# Patient Record
Sex: MALE | Race: WHITE | NOT HISPANIC OR LATINO | Employment: OTHER | URBAN - NONMETROPOLITAN AREA
[De-identification: names, ages, dates, MRNs, and addresses within clinical notes are randomized per-mention and may not be internally consistent; named-entity substitution may affect disease eponyms.]

---

## 2022-06-15 ENCOUNTER — OFFICE VISIT (OUTPATIENT)
Dept: FAMILY MEDICINE CLINIC | Facility: CLINIC | Age: 77
End: 2022-06-15

## 2022-06-15 VITALS
OXYGEN SATURATION: 99 % | HEART RATE: 69 BPM | SYSTOLIC BLOOD PRESSURE: 126 MMHG | WEIGHT: 200 LBS | HEIGHT: 76 IN | BODY MASS INDEX: 24.36 KG/M2 | TEMPERATURE: 96 F | DIASTOLIC BLOOD PRESSURE: 78 MMHG

## 2022-06-15 DIAGNOSIS — H93.8X3 EAR CONGESTION, BILATERAL: Primary | ICD-10-CM

## 2022-06-15 DIAGNOSIS — H61.23 BILATERAL IMPACTED CERUMEN: ICD-10-CM

## 2022-06-15 PROCEDURE — 99202 OFFICE O/P NEW SF 15 MIN: CPT | Performed by: NURSE PRACTITIONER

## 2022-06-15 PROCEDURE — 69209 REMOVE IMPACTED EAR WAX UNI: CPT | Performed by: NURSE PRACTITIONER

## 2022-06-15 RX ORDER — METHOCARBAMOL 500 MG/1
TABLET, FILM COATED ORAL AS NEEDED
COMMUNITY

## 2022-06-15 RX ORDER — DOXAZOSIN 8 MG/1
TABLET ORAL
COMMUNITY

## 2022-06-15 RX ORDER — OMEPRAZOLE 40 MG/1
CAPSULE, DELAYED RELEASE ORAL
COMMUNITY
End: 2022-09-29 | Stop reason: ALTCHOICE

## 2022-06-15 RX ORDER — MECLIZINE HYDROCHLORIDE 25 MG/1
TABLET ORAL
COMMUNITY

## 2022-06-15 RX ORDER — MONTELUKAST SODIUM 10 MG/1
TABLET ORAL
COMMUNITY

## 2022-06-15 RX ORDER — METRONIDAZOLE 7.5 MG/G
GEL TOPICAL
COMMUNITY

## 2022-06-15 RX ORDER — SILDENAFIL 100 MG/1
TABLET, FILM COATED ORAL
COMMUNITY

## 2022-06-15 RX ORDER — TRIAMCINOLONE ACETONIDE 1 MG/G
CREAM TOPICAL
COMMUNITY

## 2022-06-15 RX ORDER — POLYETHYLENE GLYCOL 3350 17 G/17G
POWDER, FOR SOLUTION ORAL
COMMUNITY

## 2022-06-15 RX ORDER — POTASSIUM CHLORIDE 750 MG/1
TABLET, FILM COATED, EXTENDED RELEASE ORAL
COMMUNITY

## 2022-06-15 RX ORDER — PROMETHAZINE HYDROCHLORIDE 25 MG/1
TABLET ORAL
COMMUNITY

## 2022-06-15 RX ORDER — AZELASTINE 1 MG/ML
SPRAY, METERED NASAL
COMMUNITY

## 2022-06-15 RX ORDER — BISOPROLOL FUMARATE 5 MG/1
TABLET, FILM COATED ORAL
COMMUNITY

## 2022-06-15 RX ORDER — BACLOFEN 10 MG/1
TABLET ORAL EVERY 6 HOURS SCHEDULED
COMMUNITY
End: 2022-09-29

## 2022-06-15 NOTE — PROGRESS NOTES
"Chief Complaint  Ear Fullness (Check for ear wax. )    Patient is here for an urgent care/acute visit.  Patient has an established, non-[facility] Primary Care Provider.       Subjective          Bobby Mariano presents to [facility] for acute care (ear congestion).    Ear Fullness   There is pain in both ears. This is a recurrent problem. Episode onset: this episode approximately 9 months ago. The problem occurs constantly. The problem has been gradually worsening. There has been no fever. The patient is experiencing no pain. Associated symptoms include hearing loss and rhinorrhea. Pertinent negatives include no abdominal pain, coughing, diarrhea, ear discharge, headaches, neck pain, rash, sore throat or vomiting. Associated symptoms comments: Seasonal allergies. He has tried nothing for the symptoms. His past medical history is significant for hearing loss.     Objective   Vital Signs:  /78 (BP Location: Left arm, Patient Position: Sitting, Cuff Size: Adult)   Pulse 69   Temp 96 °F (35.6 °C) (Temporal)   Ht 193 cm (76\")   Wt 90.7 kg (200 lb)   SpO2 99%   BMI 24.34 kg/m²     BMI:   BMI is within normal parameters. No other follow-up for BMI required.      Physical Exam  Vitals and nursing note reviewed.   Constitutional:       General: He is awake.      Appearance: Normal appearance.   HENT:      Head: Normocephalic.      Right Ear: Hearing, tympanic membrane and external ear normal. There is impacted cerumen.      Left Ear: Hearing, tympanic membrane and external ear normal. There is impacted cerumen.      Ears:      Comments: Slight erythema noted to bilateral external auditory canals     Nose: Nose normal.      Mouth/Throat:      Lips: Pink.      Mouth: Mucous membranes are moist.   Eyes:      General: Lids are normal.      Conjunctiva/sclera: Conjunctivae normal.      Pupils: Pupils are equal, round, and reactive to light.   Cardiovascular:      Rate and Rhythm: Normal " rate and regular rhythm.      Pulses: Normal pulses.      Heart sounds: Normal heart sounds.   Pulmonary:      Effort: Pulmonary effort is normal.      Breath sounds: Normal breath sounds.   Abdominal:      General: Abdomen is protuberant. Bowel sounds are normal.      Palpations: Abdomen is soft.      Tenderness: There is no abdominal tenderness.   Musculoskeletal:         General: Normal range of motion.      Cervical back: Normal range of motion.      Right lower leg: No edema.      Left lower leg: No edema.   Lymphadenopathy:      Cervical: No cervical adenopathy.   Skin:     General: Skin is warm and dry.      Capillary Refill: Capillary refill takes less than 2 seconds.   Neurological:      Mental Status: He is alert and oriented to person, place, and time.      Sensory: Sensation is intact.      Motor: Motor function is intact.      Coordination: Coordination is intact.      Gait: Gait is intact.   Psychiatric:         Attention and Perception: Attention and perception normal.         Mood and Affect: Mood and affect normal.         Speech: Speech normal.         Behavior: Behavior normal. Behavior is cooperative.         Thought Content: Thought content normal.         Cognition and Memory: Cognition normal.         Judgment: Judgment normal.        Result Review :   The following data was reviewed by: HILLARY Allen on 06/15/2022:     Ear Cerumen Removal    Date/Time: 6/15/2022 10:24 AM  Performed by: Elizabeth Miller APRN  Authorized by: Elizabeth Miller APRN     Anesthesia:  Local Anesthetic: none  Location details: right ear and left ear  Patient tolerance: patient tolerated the procedure well with no immediate complications  Comments: Performed by Renny Wong. Slight erythema noted to bilateral ear canals post procedure.  TM WNL.   Procedure type: irrigation   Sedation:  Patient sedated: no        Assessment and Plan    Diagnoses and all orders for this visit:    1. Ear congestion, bilateral  (Primary)  -     Cerumen Removal    2. Bilateral impacted cerumen  -     Cerumen Removal         I spent 20 minutes caring for Bobby on this date of service. This time includes time spent by me in the following activities:preparing for the visit, reviewing tests, obtaining and/or reviewing a separately obtained history, performing a medically appropriate examination and/or evaluation , counseling and educating the patient/family/caregiver, ordering medications, tests, or procedures, documenting information in the medical record and independently interpreting results and communicating that information with the patient/family/caregiver     Follow Up   Return if symptoms worsen or fail to improve / PCP.    Patient was given instructions and counseling regarding his condition or for health maintenance advice. Please see specific information pulled into the AVS if appropriate.       This document has been electronically signed by HILLARY Allen  Neida 15, 2022 11:32 EDT

## 2022-09-29 ENCOUNTER — OFFICE VISIT (OUTPATIENT)
Dept: FAMILY MEDICINE CLINIC | Facility: CLINIC | Age: 77
End: 2022-09-29

## 2022-09-29 VITALS
WEIGHT: 203 LBS | OXYGEN SATURATION: 97 % | HEIGHT: 76 IN | DIASTOLIC BLOOD PRESSURE: 78 MMHG | TEMPERATURE: 97.7 F | BODY MASS INDEX: 24.72 KG/M2 | SYSTOLIC BLOOD PRESSURE: 118 MMHG | RESPIRATION RATE: 20 BRPM | HEART RATE: 70 BPM

## 2022-09-29 DIAGNOSIS — H61.23 BILATERAL IMPACTED CERUMEN: ICD-10-CM

## 2022-09-29 DIAGNOSIS — S90.812A ABRASION, LEFT FOOT, INITIAL ENCOUNTER: Primary | ICD-10-CM
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
## 2022-09-29 PROCEDURE — 99213 OFFICE O/P EST LOW 20 MIN: CPT | Performed by: NURSE PRACTITIONER

## 2022-09-29 PROCEDURE — 69209 REMOVE IMPACTED EAR WAX UNI: CPT | Performed by: NURSE PRACTITIONER

## 2022-09-29 RX ORDER — PANTOPRAZOLE SODIUM 20 MG/1
TABLET, DELAYED RELEASE ORAL
COMMUNITY

## 2022-09-29 RX ORDER — AZITHROMYCIN 250 MG/1
TABLET, FILM COATED ORAL
Qty: 6 TABLET | Refills: 0 | Status: SHIPPED | OUTPATIENT
Start: 2022-09-29 | End: 2022-10-25

## 2022-09-29 NOTE — PROGRESS NOTES
"Chief Complaint  Cat scratch (Top of left foot this past week-end) and ears feel full    Patient is here for an urgent care/acute visit.  Patient has an established, non-[facility] Primary Care Provider.     Subjective          Bobby Mariano presents to [facility] for acute care (cat scratch).    Abrasion  This is a new problem. The current episode started in the past 7 days (Fri or Sat last week). The problem occurs constantly. The problem has been gradually improving. Pertinent negatives include no abdominal pain, anorexia, arthralgias, change in bowel habit, chest pain, chills, congestion, coughing, diaphoresis, fatigue, fever, headaches, joint swelling, myalgias, nausea, neck pain, numbness, rash, sore throat, swollen glands, urinary symptoms, vertigo, visual change, vomiting or weakness. Associated symptoms comments: Soreness; two abrasions to left dorsal foot. Nothing aggravates the symptoms. Treatments tried: bacitracin ointment. The treatment provided moderate relief.     Objective   Vital Signs:  /78 (BP Location: Right arm, Patient Position: Sitting, Cuff Size: Adult)   Pulse 70   Temp 97.7 °F (36.5 °C) (Temporal)   Resp 20   Ht 193 cm (76\")   Wt 92.1 kg (203 lb)   SpO2 97%   BMI 24.71 kg/m²     BMI:   BMI is within normal parameters. No other follow-up for BMI required.      Physical Exam  Vitals and nursing note reviewed.   Constitutional:       General: He is awake.      Appearance: Normal appearance.   HENT:      Head: Normocephalic.      Right Ear: Hearing and external ear normal. There is impacted cerumen.      Left Ear: Hearing and external ear normal. There is impacted cerumen.      Nose: Nose normal.      Mouth/Throat:      Lips: Pink.      Mouth: Mucous membranes are moist.   Eyes:      General: Lids are normal.      Conjunctiva/sclera: Conjunctivae normal.      Pupils: Pupils are equal, round, and reactive to light.   Cardiovascular:      Rate and " Rhythm: Normal rate and regular rhythm.      Heart sounds: Normal heart sounds.   Pulmonary:      Effort: Pulmonary effort is normal.      Breath sounds: Normal breath sounds.   Abdominal:      General: Abdomen is protuberant. Bowel sounds are normal.      Palpations: Abdomen is soft.      Tenderness: There is no abdominal tenderness.   Musculoskeletal:         General: Normal range of motion.      Cervical back: Normal range of motion.        Feet:    Skin:     General: Skin is warm and dry.      Capillary Refill: Capillary refill takes less than 2 seconds.   Neurological:      Mental Status: He is alert and oriented to person, place, and time.      Sensory: Sensation is intact.      Motor: Motor function is intact.      Coordination: Coordination is intact.      Gait: Gait is intact.   Psychiatric:         Attention and Perception: Attention and perception normal.         Mood and Affect: Mood and affect normal.         Speech: Speech normal.         Behavior: Behavior normal. Behavior is cooperative.         Thought Content: Thought content normal.         Cognition and Memory: Cognition normal.         Judgment: Judgment normal.        Result Review :   The following data was reviewed by: HILLARY Allen on 09/29/2022:    Ear Cerumen Removal    Date/Time: 9/29/2022 12:36 PM  Performed by: Elizabeth Miller APRN  Authorized by: Elizabeth Miller APRN     Anesthesia:  Local Anesthetic: none  Location details: left ear and right ear  Patient tolerance: patient tolerated the procedure well with no immediate complications  Comments: Performed by Renny Wong. Bilateral external canals clear with TM WNL.   Procedure type: irrigation   Sedation:  Patient sedated: no          Assessment and Plan    Diagnoses and all orders for this visit:    1. Abrasion, left foot, initial encounter (Primary)  -     mupirocin (BACTROBAN) 2 % ointment; Apply 1 application topically to the appropriate area as directed 3 (Three) Times a  Day.  Dispense: 1 each; Refill: 0  -     azithromycin (Zithromax) 250 MG tablet; Take 2 tablets the first day, then 1 tablet daily for 4 days.  Dispense: 6 tablet; Refill: 0    2. Cat scratch  -     mupirocin (BACTROBAN) 2 % ointment; Apply 1 application topically to the appropriate area as directed 3 (Three) Times a Day.  Dispense: 1 each; Refill: 0  -     azithromycin (Zithromax) 250 MG tablet; Take 2 tablets the first day, then 1 tablet daily for 4 days.  Dispense: 6 tablet; Refill: 0    Other orders  -     Ear Cerumen Removal         I spent 25 minutes caring for Bobby on this date of service. This time includes time spent by me in the following activities:preparing for the visit, reviewing tests, obtaining and/or reviewing a separately obtained history, performing a medically appropriate examination and/or evaluation , counseling and educating the patient/family/caregiver, ordering medications, tests, or procedures, documenting information in the medical record and independently interpreting results and communicating that information with the patient/family/caregiver     Follow Up   Return if symptoms worsen or fail to improve.    Patient was given instructions and counseling regarding his condition or for health maintenance advice. Please see specific information pulled into the AVS if appropriate.       This document has been electronically signed by HILLARY Allen  September 29, 2022 12:43 EDT

## 2022-10-25 ENCOUNTER — OFFICE VISIT (OUTPATIENT)
Dept: FAMILY MEDICINE CLINIC | Facility: CLINIC | Age: 77
End: 2022-10-25

## 2022-10-25 VITALS
TEMPERATURE: 98.2 F | RESPIRATION RATE: 18 BRPM | BODY MASS INDEX: 24.72 KG/M2 | HEIGHT: 76 IN | OXYGEN SATURATION: 98 % | HEART RATE: 72 BPM | WEIGHT: 203 LBS | SYSTOLIC BLOOD PRESSURE: 139 MMHG | DIASTOLIC BLOOD PRESSURE: 84 MMHG

## 2022-10-25 DIAGNOSIS — H61.21 IMPACTED CERUMEN OF RIGHT EAR: Primary | ICD-10-CM

## 2022-10-25 PROCEDURE — 69209 REMOVE IMPACTED EAR WAX UNI: CPT | Performed by: NURSE PRACTITIONER

## 2022-10-25 PROCEDURE — 99213 OFFICE O/P EST LOW 20 MIN: CPT | Performed by: NURSE PRACTITIONER

## 2022-10-25 NOTE — PROGRESS NOTES
"Chief Complaint  Ear Fullness (Requesting Ear irrigation:)    Patient is here for an urgent care/acute visit.  Patient has an established, non-[facility] Primary Care Provider.     Subjective          Bobby Mariano presents to [facility] for acute care (ear congestion).    Ear Fullness   There is pain in the right ear. This is a recurrent problem. The current episode started in the past 7 days. The problem has been gradually worsening. There has been no fever. The patient is experiencing no pain. Associated symptoms include hearing loss. Pertinent negatives include no abdominal pain, coughing, diarrhea, ear discharge, headaches, neck pain, rash, rhinorrhea, sore throat or vomiting. He has tried nothing for the symptoms. His past medical history is significant for hearing loss.     Objective   Vital Signs:  /84   Pulse 72   Temp 98.2 °F (36.8 °C) (Temporal)   Resp 18   Ht 193 cm (76\")   Wt 92.1 kg (203 lb)   SpO2 98%   BMI 24.71 kg/m²     BMI:   BMI is within normal parameters. No other follow-up for BMI required.      Physical Exam  Vitals and nursing note reviewed.   Constitutional:       General: He is awake.      Appearance: Normal appearance.   HENT:      Head: Normocephalic.      Right Ear: Tympanic membrane and external ear normal. Decreased hearing noted. There is impacted cerumen.      Left Ear: Tympanic membrane and external ear normal. Decreased hearing noted.      Ears:      Comments: Pt wears bilateral hearing aids       Nose: Nose normal.      Mouth/Throat:      Lips: Pink.      Mouth: Mucous membranes are moist.   Eyes:      General: Lids are normal.      Conjunctiva/sclera: Conjunctivae normal.      Pupils: Pupils are equal, round, and reactive to light.   Cardiovascular:      Rate and Rhythm: Normal rate.   Pulmonary:      Effort: Pulmonary effort is normal.   Musculoskeletal:         General: Normal range of motion.      Cervical back: Normal range of " motion.   Skin:     General: Skin is warm and dry.      Capillary Refill: Capillary refill takes less than 2 seconds.   Neurological:      Mental Status: He is alert and oriented to person, place, and time.      Sensory: Sensation is intact.      Motor: Motor function is intact.      Coordination: Coordination is intact.      Gait: Gait is intact.   Psychiatric:         Attention and Perception: Attention and perception normal.         Mood and Affect: Mood and affect normal.         Speech: Speech normal.         Behavior: Behavior normal. Behavior is cooperative.         Thought Content: Thought content normal.         Cognition and Memory: Cognition normal.         Judgment: Judgment normal.        Result Review :   The following data was reviewed by: IHLLARY Allen on 10/25/2022:       Ear Cerumen Removal    Date/Time: 10/25/2022 10:05 AM  Performed by: Elizabeth Miller APRN  Authorized by: Elizabeth Miller APRN   Location details: right ear  Patient tolerance: patient tolerated the procedure well with no immediate complications  Comments: Performed by Mariah Dawn MA. Right external auditory canal clear of cerumen; no erythema/edema noted. TM WNL.  Procedure type: irrigation   Sedation:  Patient sedated: no              Assessment and Plan    Diagnoses and all orders for this visit:    1. Impacted cerumen of right ear (Primary)  -     Cerumen Removal           I spent 15 minutes caring for Bobby on this date of service. This time includes time spent by me in the following activities:preparing for the visit, reviewing tests, obtaining and/or reviewing a separately obtained history, performing a medically appropriate examination and/or evaluation , counseling and educating the patient/family/caregiver, ordering medications, tests, or procedures, documenting information in the medical record and independently interpreting results and communicating that information with the patient/family/caregiver      Follow Up   Return if symptoms worsen or fail to improve.    Patient was given instructions and counseling regarding his condition or for health maintenance advice. Please see specific information pulled into the AVS if appropriate.       This document has been electronically signed by HILLARY Allen  November 9, 2022 09:08 EST

## 2023-02-20 ENCOUNTER — TELEPHONE (OUTPATIENT)
Dept: FAMILY MEDICINE CLINIC | Facility: CLINIC | Age: 78
End: 2023-02-20

## 2023-02-20 NOTE — TELEPHONE ENCOUNTER
Caller: Bobby Mariano    Relationship to patient: Self    Best call back number: [phone]    Chief complaint: REQUESTING EARWAX REMOVAL FROM BOTH EARS     Type of visit: IN-OFFICE PROCEDURE    Requested date: ASAP    If rescheduling, when is the original appointment: N/A    Additional notes:PLEASE CALL AND ADVISE     (PLEASE ADD LILIANE BUNDY AS THE PATIENT'S PCP)

## 2023-02-22 ENCOUNTER — OFFICE VISIT (OUTPATIENT)
Dept: FAMILY MEDICINE CLINIC | Facility: CLINIC | Age: 78
End: 2023-02-22
Payer: MEDICARE

## 2023-02-22 VITALS
HEART RATE: 81 BPM | WEIGHT: 208 LBS | RESPIRATION RATE: 18 BRPM | TEMPERATURE: 97.6 F | BODY MASS INDEX: 25.33 KG/M2 | HEIGHT: 76 IN | OXYGEN SATURATION: 98 % | DIASTOLIC BLOOD PRESSURE: 71 MMHG | SYSTOLIC BLOOD PRESSURE: 121 MMHG

## 2023-02-22 DIAGNOSIS — H61.23 BILATERAL IMPACTED CERUMEN: Primary | ICD-10-CM

## 2023-02-22 PROCEDURE — 99213 OFFICE O/P EST LOW 20 MIN: CPT | Performed by: NURSE PRACTITIONER

## 2023-02-22 PROCEDURE — 69209 REMOVE IMPACTED EAR WAX UNI: CPT | Performed by: NURSE PRACTITIONER

## 2023-02-22 NOTE — PROGRESS NOTES
"Chief Complaint  Ear Fullness (Ears need cleaned out./)    Patient is here for an urgent care/acute visit.  Patient has an established, non-[facility] Primary Care Provider.     Subjective          Bobby Mariano presents to [facility] for acute care (ear congestion).    Ear Fullness   There is pain in both ears. This is a recurrent problem. The current episode started more than 1 year ago. The problem occurs constantly. The problem has been waxing and waning. There has been no fever. The patient is experiencing no pain. Associated symptoms include hearing loss. Pertinent negatives include no abdominal pain, coughing, diarrhea, ear discharge, headaches, neck pain, rash, rhinorrhea, sore throat or vomiting. Treatments tried: cerumen removal. The treatment provided moderate relief. His past medical history is significant for hearing loss.     Objective   Vital Signs:  /71   Pulse 81   Temp 97.6 °F (36.4 °C) (Temporal)   Resp 18   Ht 193 cm (76\")   Wt 94.3 kg (208 lb)   SpO2 98%   BMI 25.32 kg/m²     BMI:   BMI is >= 25 and <30. (Overweight) The following options were offered after discussion;: weight loss educational material (shared in after visit summary)      Physical Exam  Vitals and nursing note reviewed.   Constitutional:       General: He is awake.      Appearance: Normal appearance.   HENT:      Head: Normocephalic.      Right Ear: Tympanic membrane, ear canal and external ear normal. Decreased hearing noted. There is impacted cerumen.      Left Ear: Tympanic membrane, ear canal and external ear normal. Decreased hearing noted. There is impacted cerumen.      Ears:      Comments: Pt wears carlos hearing aids     Nose: Nose normal.      Mouth/Throat:      Lips: Pink.      Mouth: Mucous membranes are moist.      Pharynx: Oropharynx is clear.   Eyes:      General: Lids are normal.      Conjunctiva/sclera: Conjunctivae normal.      Pupils: Pupils are equal, round, and reactive " to light.   Cardiovascular:      Rate and Rhythm: Normal rate and regular rhythm.      Heart sounds: Normal heart sounds.   Pulmonary:      Effort: Pulmonary effort is normal.      Breath sounds: Normal breath sounds.   Abdominal:      General: Abdomen is protuberant. Bowel sounds are normal.      Palpations: Abdomen is soft.      Tenderness: There is no abdominal tenderness.   Musculoskeletal:         General: Normal range of motion.      Cervical back: Normal range of motion and neck supple.   Skin:     General: Skin is warm and dry.      Capillary Refill: Capillary refill takes less than 2 seconds.   Neurological:      Mental Status: He is alert and oriented to person, place, and time.      Sensory: Sensation is intact.      Motor: Motor function is intact.      Coordination: Coordination is intact.      Gait: Gait is intact.   Psychiatric:         Attention and Perception: Attention and perception normal.         Mood and Affect: Mood and affect normal.         Speech: Speech normal.         Behavior: Behavior normal. Behavior is cooperative.         Thought Content: Thought content normal.         Cognition and Memory: Cognition normal.         Judgment: Judgment normal.          Result Review :   The following data was reviewed by: HILLARY Allen on 02/22/2023:       Ear Cerumen Removal    Date/Time: 2/22/2023 8:20 AM  Performed by: Elizabeth Miller APRN  Authorized by: Elizabeth Miller APRN     Anesthesia:  Local Anesthetic: none  Location details: left ear and right ear  Patient tolerance: patient tolerated the procedure well with no immediate complications  Comments: Performed by Mariah Dawn MA.  Mohan external auditory canals clear of cerumen, no erythema/edema noted.  TM WNL.  Procedure type: irrigation   Sedation:  Patient sedated: no           Assessment and Plan    Diagnoses and all orders for this visit:    1. Bilateral impacted cerumen (Primary)  -     Cerumen Removal  -     carbamide peroxide  (Debrox) 6.5 % otic solution; Administer 5 drops into both ears 2 (Two) Times a Day.  Dispense: 15 mL; Refill: 0         I spent 15 minutes caring for Bobby on this date of service. This time includes time spent by me in the following activities:preparing for the visit, reviewing tests, obtaining and/or reviewing a separately obtained history, performing a medically appropriate examination and/or evaluation , counseling and educating the patient/family/caregiver, ordering medications, tests, or procedures, documenting information in the medical record and independently interpreting results and communicating that information with the patient/family/caregiver     Follow Up   Return if symptoms worsen or fail to improve.    Patient was given instructions and counseling regarding his condition or for health maintenance advice. Please see specific information pulled into the AVS if appropriate.       This document has been electronically signed by HILLARY Allen  February 22, 2023 14:17 EST

## 2023-04-14 ENCOUNTER — OFFICE VISIT (OUTPATIENT)
Dept: FAMILY MEDICINE CLINIC | Facility: CLINIC | Age: 78
End: 2023-04-14
Payer: MEDICARE

## 2023-04-14 VITALS
SYSTOLIC BLOOD PRESSURE: 138 MMHG | BODY MASS INDEX: 25.69 KG/M2 | TEMPERATURE: 98.6 F | HEART RATE: 72 BPM | HEIGHT: 76 IN | WEIGHT: 211 LBS | OXYGEN SATURATION: 98 % | DIASTOLIC BLOOD PRESSURE: 75 MMHG | RESPIRATION RATE: 20 BRPM

## 2023-04-14 DIAGNOSIS — H61.23 BILATERAL IMPACTED CERUMEN: Primary | ICD-10-CM

## 2023-04-14 RX ORDER — DONEPEZIL HYDROCHLORIDE 5 MG/1
1 TABLET, FILM COATED ORAL NIGHTLY
COMMUNITY
Start: 2023-04-10

## 2023-04-14 NOTE — PROGRESS NOTES
"Chief Complaint  Ear Fullness (Requests ears irrigated (both))    Patient is here for an urgent care/acute visit.  Patient has an established, non-[facility] Primary Care Provider.       Subjective          Bobby Mariano presents to [facility] PRIMARY CARE    History of Present Illness  The patient is a 78 yo male who is here because of both ears feeling stopped up and unable to hear even with his hearing aid for several days. He denies any pain, ringing, runny nose or congestion.      Objective   Vital Signs:  /75 (BP Location: Right arm, Patient Position: Sitting, Cuff Size: Adult)   Pulse 72   Temp 98.6 °F (37 °C) (Temporal)   Resp 20   Ht 193 cm (76\")   Wt 95.7 kg (211 lb)   SpO2 98%   BMI 25.68 kg/m²     Physical Exam  Vitals and nursing note reviewed.   Constitutional:       General: He is not in acute distress.     Appearance: Normal appearance. He is well-developed and well-groomed.   HENT:      Head: Normocephalic and atraumatic.      Jaw: No tenderness or pain on movement.      Salivary Glands: Right salivary gland is not diffusely enlarged. Left salivary gland is not diffusely enlarged.      Right Ear: Tympanic membrane and ear canal normal. Decreased hearing noted. No drainage, swelling or tenderness. There is impacted cerumen.      Left Ear: Tympanic membrane and ear canal normal. Decreased hearing noted. No drainage or tenderness. There is impacted cerumen.      Nose: No congestion or rhinorrhea.      Mouth/Throat:      Mouth: Mucous membranes are moist.      Pharynx: No oropharyngeal exudate or posterior oropharyngeal erythema.   Eyes:      General: Lids are normal. No allergic shiner or scleral icterus.     Conjunctiva/sclera: Conjunctivae normal.      Pupils: Pupils are equal, round, and reactive to light.   Neck:      Thyroid: No thyroid mass, thyromegaly or thyroid tenderness.      Trachea: Trachea normal.   Cardiovascular:      Rate and Rhythm: Normal rate and regular " rhythm.  No extrasystoles are present.     Pulses: Normal pulses.      Heart sounds: Normal heart sounds. No murmur heard.  Pulmonary:      Effort: Pulmonary effort is normal. No respiratory distress.      Breath sounds: Normal breath sounds. No decreased breath sounds, wheezing, rhonchi or rales.   Chest:   Breasts:     Right: Normal.      Left: Normal.   Abdominal:      General: Bowel sounds are normal.      Palpations: Abdomen is soft.      Tenderness: There is no abdominal tenderness. There is no right CVA tenderness, left CVA tenderness, guarding or rebound.   Musculoskeletal:      Cervical back: Neck supple.      Right lower leg: No edema.      Left lower leg: No edema.   Lymphadenopathy:      Cervical: No cervical adenopathy.      Upper Body:      Right upper body: No supraclavicular or axillary adenopathy.      Left upper body: No supraclavicular or axillary adenopathy.   Skin:     General: Skin is warm.      Nails: There is no clubbing.   Neurological:      General: No focal deficit present.      Mental Status: He is alert and oriented to person, place, and time.      Sensory: Sensation is intact.      Motor: Motor function is intact.      Coordination: Coordination is intact.   Psychiatric:         Attention and Perception: Attention and perception normal.         Mood and Affect: Mood normal.         Speech: Speech normal.         Behavior: Behavior normal. Behavior is cooperative.         Thought Content: Thought content normal.          Result Review :          Ear Cerumen Removal    Date/Time: 4/14/2023 4:45 PM  Performed by: Clinton Borjas MD  Authorized by: Clinton Borjas MD     Anesthesia:  Local Anesthetic: none  Location details: left ear and right ear  Patient tolerance: patient tolerated the procedure well with no immediate complications  Procedure type: irrigation   Sedation:  Patient sedated: no              Assessment and Plan    Diagnoses and all orders for this visit:    1. Bilateral  impacted cerumen (Primary)    Other orders  -     Ear Cerumen Removal    The patient was advised to get Debrox OTC and use 3-4 drops in each ear every two weeks.       I spent 15 minutes caring for Bobby on this date of service. This time includes time spent by me in the following activities:preparing for the visit, performing a medically appropriate examination and/or evaluation , counseling and educating the patient/family/caregiver, ordering medications, tests, or procedures and documenting information in the medical record       Follow Up   Return if symptoms worsen or fail to improve.    Patient was given instructions and counseling regarding his condition or for health maintenance advice. Please see specific information pulled into the AVS if appropriate. The patient was advised to RTC if symptoms worsen or fail to improve.      This document has been electronically signed by Clinton Borjas MD  April 14, 2023 16:49 EDT

## 2023-10-10 ENCOUNTER — OFFICE VISIT (OUTPATIENT)
Dept: FAMILY MEDICINE CLINIC | Facility: CLINIC | Age: 78
End: 2023-10-10
Payer: MEDICARE

## 2023-10-10 VITALS
DIASTOLIC BLOOD PRESSURE: 68 MMHG | BODY MASS INDEX: 28.01 KG/M2 | HEART RATE: 54 BPM | HEIGHT: 76 IN | RESPIRATION RATE: 18 BRPM | SYSTOLIC BLOOD PRESSURE: 106 MMHG | OXYGEN SATURATION: 97 % | WEIGHT: 230 LBS

## 2023-10-10 DIAGNOSIS — H61.21 IMPACTED CERUMEN OF RIGHT EAR: Primary | ICD-10-CM

## 2023-10-10 NOTE — PROGRESS NOTES
"Chief Complaint  Ear Fullness (Wax removal./)    Patient is here for an urgent care/acute visit.  Patient has an established, non-[facility] Primary Care Provider.       Subjective          Bobby Mariano presents to [facility] PRIMARY CARE    History of Present Illness  The patient is a 78 y.o. male who is here today because of right ear fullness, he thinks he has a lot of wax in his ear. He denies any pain, ringing or dizziness.      Objective   Vital Signs:  /68   Pulse 54   Resp 18   Ht 193 cm (76\")   Wt 104 kg (230 lb)   SpO2 97%   BMI 28.00 kg/mý     Physical Exam  Vitals and nursing note reviewed.   Constitutional:       General: He is not in acute distress.     Appearance: Normal appearance. He is well-developed and well-groomed.   HENT:      Head: Normocephalic and atraumatic.      Jaw: No tenderness or pain on movement.      Salivary Glands: Right salivary gland is not diffusely enlarged. Left salivary gland is not diffusely enlarged.      Right Ear: No decreased hearing noted. No drainage or tenderness. There is impacted cerumen.      Left Ear: Tympanic membrane and ear canal normal. There is no impacted cerumen.      Nose: No congestion or rhinorrhea.      Mouth/Throat:      Mouth: Mucous membranes are moist.      Pharynx: No oropharyngeal exudate or posterior oropharyngeal erythema.   Eyes:      General: Lids are normal. No allergic shiner or scleral icterus.     Conjunctiva/sclera: Conjunctivae normal.      Pupils: Pupils are equal, round, and reactive to light.   Neck:      Thyroid: No thyroid mass, thyromegaly or thyroid tenderness.      Trachea: Trachea normal.   Cardiovascular:      Rate and Rhythm: Normal rate and regular rhythm. No extrasystoles are present.     Pulses: Normal pulses.      Heart sounds: Normal heart sounds. No murmur heard.  Pulmonary:      Effort: Pulmonary effort is normal. No respiratory distress.      Breath sounds: Normal breath sounds. No " decreased breath sounds, wheezing, rhonchi or rales.   Chest:   Breasts:     Right: Normal.      Left: Normal.   Abdominal:      General: Bowel sounds are normal.      Palpations: Abdomen is soft.      Tenderness: There is no abdominal tenderness. There is no right CVA tenderness, left CVA tenderness, guarding or rebound.   Musculoskeletal:      Cervical back: Neck supple.      Right lower leg: No edema.      Left lower leg: No edema.   Lymphadenopathy:      Cervical: No cervical adenopathy.      Upper Body:      Right upper body: No supraclavicular or axillary adenopathy.      Left upper body: No supraclavicular or axillary adenopathy.   Skin:     General: Skin is warm.      Nails: There is no clubbing.   Neurological:      General: No focal deficit present.      Mental Status: He is alert and oriented to person, place, and time.      Sensory: Sensation is intact.      Motor: Motor function is intact.      Coordination: Coordination is intact.   Psychiatric:         Attention and Perception: Attention and perception normal.         Mood and Affect: Mood normal.         Speech: Speech normal.         Behavior: Behavior normal. Behavior is cooperative.         Thought Content: Thought content normal.          Result Review :          Ear Cerumen Removal    Date/Time: 10/10/2023 9:27 AM    Performed by: Clinton Borjas MD  Authorized by: Clinton Borjas MD    Anesthesia:  Local Anesthetic: none  Ceruminolytics applied: Ceruminolytics applied prior to the procedure.  Location details: right ear  Patient tolerance: patient tolerated the procedure well with no immediate complications  Procedure type: irrigation           Assessment and Plan    Diagnoses and all orders for this visit:    1. Impacted cerumen of right ear (Primary)  Comments:  removed after irrigation  Orders:  -     Ear Cerumen Removal           I spent 15 minutes caring for Bobby on this date of service. This time includes time spent by me in the  following activities:preparing for the visit, performing a medically appropriate examination and/or evaluation , counseling and educating the patient/family/caregiver, ordering medications, tests, or procedures, and documenting information in the medical record  Follow Up   No follow-ups on file.    Patient was given instructions and counseling regarding his condition or for health maintenance advice. Please see specific information pulled into the AVS if appropriate.     RTC as needed or follow up with PCP if symptoms worsen or fail to improve.      This document has been electronically signed by Clinton Borjas MD  October 10, 2023 09:33 EDT